# Patient Record
Sex: MALE | Race: WHITE | NOT HISPANIC OR LATINO | URBAN - METROPOLITAN AREA
[De-identification: names, ages, dates, MRNs, and addresses within clinical notes are randomized per-mention and may not be internally consistent; named-entity substitution may affect disease eponyms.]

---

## 2017-03-14 ENCOUNTER — IMPORTED ENCOUNTER (OUTPATIENT)
Dept: URBAN - METROPOLITAN AREA CLINIC 50 | Facility: CLINIC | Age: 77
End: 2017-03-14

## 2017-03-14 NOTE — PATIENT DISCUSSION
"""Call if vision decreases or RD warning signs increases/RD warnings given.  No signs of leftt eye retinal tear

## 2018-03-13 ENCOUNTER — IMPORTED ENCOUNTER (OUTPATIENT)
Dept: URBAN - METROPOLITAN AREA CLINIC 50 | Facility: CLINIC | Age: 78
End: 2018-03-13

## 2018-09-20 NOTE — PATIENT DISCUSSION
Continue: Pred Forte (prednisolone acetate): drops,suspension: 1% 1 drop once a day as directed into both eyes

## 2018-09-20 NOTE — PATIENT DISCUSSION
MYOPIA, OU- DISC OPT OF REFRACTIVE SX-VS-GLS/JOEN-LM-APZCIU. PT UNDERSTANDS OPTIONS AND DESIRES TO PROCEED WITH REFRACTIVE SX TO IMPROVE VA AND REDUCE DEPENDENCY ON GLS/CTLS.

## 2019-03-11 ENCOUNTER — IMPORTED ENCOUNTER (OUTPATIENT)
Dept: URBAN - METROPOLITAN AREA CLINIC 50 | Facility: CLINIC | Age: 79
End: 2019-03-11

## 2019-03-11 NOTE — PATIENT DISCUSSION
"""Informed patient that their capsular opacification is not visually significant OD or does not meet ""

## 2019-09-16 ENCOUNTER — IMPORTED ENCOUNTER (OUTPATIENT)
Dept: URBAN - METROPOLITAN AREA CLINIC 50 | Facility: CLINIC | Age: 79
End: 2019-09-16

## 2019-09-16 NOTE — PATIENT DISCUSSION
"""Follow dry ARMD without treatment. MVI/AREDS/Amsler. Patient to call if vision changes or distortion increases. Good diet/do not smoke."" ""Continue lutein 10/20mg daily . "" ""OCT Macula: OD: Stable 10

## 2021-04-17 ASSESSMENT — VISUAL ACUITY
OS_OTHER: >20/400.
OD_CC: 20/25
OD_OTHER: 20/50. 20/60.
OD_BAT: 20/50
OD_BAT: 20/40
OS_CC: 20/25-2
OS_BAT: >20/400
OD_PH: @ 16 IN
OS_OTHER: 20/60. 20/80.
OS_PH: @ 16 IN
OD_BAT: 20/30
OS_CC: J1+@ 16 IN
OD_CC: 20/25+
OD_BAT: 20/50
OS_CC: J1+
OS_OTHER: 20/30. 20/40.
OS_CC: 20/25-2
OS_BAT: 20/30
OS_BAT: 20/60
OD_CC: J1+@ 16 IN
OD_CC: J1+
OD_OTHER: 20/40. 20/200.
OS_CC: 20/25
OS_CC: J2
OS_CC: 20/400
OD_CC: 20/25-2
OD_OTHER: 20/30. 20/40.
OD_OTHER: 20/50. 20/80.
OD_CC: 20/25-2
OD_CC: J2

## 2021-04-17 ASSESSMENT — TONOMETRY
OS_IOP_MMHG: 17
OD_IOP_MMHG: 17
OD_IOP_MMHG: 18
OS_IOP_MMHG: 18
OS_IOP_MMHG: 19
OD_IOP_MMHG: 17
OD_IOP_MMHG: 17
OS_IOP_MMHG: 18

## 2021-08-05 ENCOUNTER — PREPPED CHART (OUTPATIENT)
Dept: URBAN - METROPOLITAN AREA CLINIC 53 | Facility: CLINIC | Age: 81
End: 2021-08-05

## 2021-08-05 NOTE — PATIENT DISCUSSION
"""Follow dry ARMD without treatment. MVI/AREDS/Amsler. Patient to call if vision changes or distortion increases. Good diet/do not smoke."" ""Continue lutein 10/20mg daily . "" ""OCT Macula: OD: Stable

## 2021-08-13 ENCOUNTER — ANNUAL COMPREHENSIVE EXAM (OUTPATIENT)
Dept: URBAN - METROPOLITAN AREA CLINIC 53 | Facility: CLINIC | Age: 81
End: 2021-08-13

## 2021-08-13 DIAGNOSIS — H35.3131: ICD-10-CM

## 2021-08-13 DIAGNOSIS — H16.223: ICD-10-CM

## 2021-08-13 DIAGNOSIS — H52.4: ICD-10-CM

## 2021-08-13 PROCEDURE — 92134 CPTRZ OPH DX IMG PST SGM RTA: CPT

## 2021-08-13 PROCEDURE — 92014 COMPRE OPH EXAM EST PT 1/>: CPT

## 2021-08-13 PROCEDURE — 92015 DETERMINE REFRACTIVE STATE: CPT

## 2021-08-13 ASSESSMENT — TONOMETRY
OD_IOP_MMHG: 17
OS_IOP_MMHG: 17

## 2021-08-13 ASSESSMENT — VISUAL ACUITY
OD_SC: 20/20-2
OU_CC: J1-
OS_SC: 20/25-2

## 2022-08-12 ENCOUNTER — COMPREHENSIVE EXAM (OUTPATIENT)
Dept: URBAN - METROPOLITAN AREA CLINIC 53 | Facility: CLINIC | Age: 82
End: 2022-08-12

## 2022-08-12 DIAGNOSIS — H02.834: ICD-10-CM

## 2022-08-12 DIAGNOSIS — H16.223: ICD-10-CM

## 2022-08-12 DIAGNOSIS — H02.831: ICD-10-CM

## 2022-08-12 DIAGNOSIS — H35.3131: ICD-10-CM

## 2022-08-12 PROCEDURE — 92014 COMPRE OPH EXAM EST PT 1/>: CPT

## 2022-08-12 ASSESSMENT — TONOMETRY
OS_IOP_MMHG: 13
OD_IOP_MMHG: 13

## 2022-08-12 ASSESSMENT — VISUAL ACUITY
OD_CC: 20/20
OU_CC: J1+ @ 16IN
OS_CC: 20/25

## 2023-08-11 ENCOUNTER — COMPREHENSIVE EXAM (OUTPATIENT)
Dept: URBAN - METROPOLITAN AREA CLINIC 53 | Facility: CLINIC | Age: 83
End: 2023-08-11

## 2023-08-11 DIAGNOSIS — R73.03: ICD-10-CM

## 2023-08-11 DIAGNOSIS — H02.831: ICD-10-CM

## 2023-08-11 DIAGNOSIS — H02.834: ICD-10-CM

## 2023-08-11 DIAGNOSIS — H16.223: ICD-10-CM

## 2023-08-11 DIAGNOSIS — H35.3131: ICD-10-CM

## 2023-08-11 PROCEDURE — 99214 OFFICE O/P EST MOD 30 MIN: CPT

## 2023-08-11 PROCEDURE — 92134 CPTRZ OPH DX IMG PST SGM RTA: CPT

## 2023-08-11 ASSESSMENT — TONOMETRY
OD_IOP_MMHG: 14
OS_IOP_MMHG: 13

## 2023-08-11 ASSESSMENT — VISUAL ACUITY
OD_CC: 20/20-2
OS_CC: J1
OS_CC: 20/20
OU_CC: J1
OU_CC: 20/20
OD_CC: J1

## 2024-08-15 ENCOUNTER — COMPREHENSIVE EXAM (OUTPATIENT)
Dept: URBAN - METROPOLITAN AREA CLINIC 53 | Facility: CLINIC | Age: 84
End: 2024-08-15

## 2024-08-15 DIAGNOSIS — R73.03: ICD-10-CM

## 2024-08-15 DIAGNOSIS — H35.3131: ICD-10-CM

## 2024-08-15 DIAGNOSIS — H52.11: ICD-10-CM

## 2024-08-15 DIAGNOSIS — H16.223: ICD-10-CM

## 2024-08-15 DIAGNOSIS — H52.202: ICD-10-CM

## 2024-08-15 PROCEDURE — 92134 CPTRZ OPH DX IMG PST SGM RTA: CPT

## 2024-08-15 PROCEDURE — 92014 COMPRE OPH EXAM EST PT 1/>: CPT

## 2024-08-15 ASSESSMENT — VISUAL ACUITY
OD_CC: 20/20-2
OU_CC: J1
OS_CC: 20/20

## 2024-08-15 ASSESSMENT — TONOMETRY
OS_IOP_MMHG: 16
OD_IOP_MMHG: 16